# Patient Record
Sex: FEMALE | Race: OTHER
[De-identification: names, ages, dates, MRNs, and addresses within clinical notes are randomized per-mention and may not be internally consistent; named-entity substitution may affect disease eponyms.]

---

## 2020-05-14 ENCOUNTER — HOSPITAL ENCOUNTER (EMERGENCY)
Dept: HOSPITAL 54 - ER | Age: 60
Discharge: HOME | End: 2020-05-14
Payer: COMMERCIAL

## 2020-05-14 VITALS — BODY MASS INDEX: 18.33 KG/M2 | WEIGHT: 110 LBS | HEIGHT: 65 IN

## 2020-05-14 VITALS — DIASTOLIC BLOOD PRESSURE: 73 MMHG | SYSTOLIC BLOOD PRESSURE: 121 MMHG
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
## 2020-05-14 DIAGNOSIS — Z88.0: ICD-10-CM

## 2020-05-14 DIAGNOSIS — F17.200: ICD-10-CM
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
## 2020-05-14 DIAGNOSIS — Z98.890: ICD-10-CM

## 2020-05-14 DIAGNOSIS — F10.129: ICD-10-CM

## 2020-05-14 DIAGNOSIS — Z79.899: ICD-10-CM
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
## 2020-05-14 DIAGNOSIS — S01.80XA: Primary | ICD-10-CM
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
## 2020-05-14 PROCEDURE — A6403 STERILE GAUZE>16 <= 48 SQ IN: HCPCS

## 2020-05-14 PROCEDURE — 90715 TDAP VACCINE 7 YRS/> IM: CPT

## 2020-05-14 PROCEDURE — 70450 CT HEAD/BRAIN W/O DYE: CPT

## 2020-05-14 PROCEDURE — 90471 IMMUNIZATION ADMIN: CPT

## 2020-05-14 PROCEDURE — 99285 EMERGENCY DEPT VISIT HI MDM: CPT

## 2020-05-14 PROCEDURE — 72125 CT NECK SPINE W/O DYE: CPT

## 2020-05-14 NOTE — NUR
PATIENT CAME FROM HOME C/O GROUND LEVEL FALL. ACCORDING TO LAFD, SHE HAD FEELL 
AND HAS A LACERATION TO HER BACK OF HEAD FROM HTITING MARBLE FLOOR. AAOX3. NO 
SOB. BREATHING EVENLY AND UNLABORED ON ROOM AIR. CONNECTED TO MONITOR. PATIENT 
CHANGED INTO GOWN.